# Patient Record
Sex: FEMALE | Race: WHITE | Employment: OTHER | URBAN - METROPOLITAN AREA
[De-identification: names, ages, dates, MRNs, and addresses within clinical notes are randomized per-mention and may not be internally consistent; named-entity substitution may affect disease eponyms.]

---

## 2017-03-14 ENCOUNTER — OFFICE VISIT (OUTPATIENT)
Dept: PRIMARY CARE CLINIC | Age: 57
End: 2017-03-14
Payer: MEDICARE

## 2017-03-14 VITALS
HEIGHT: 63 IN | BODY MASS INDEX: 24.45 KG/M2 | WEIGHT: 138 LBS | DIASTOLIC BLOOD PRESSURE: 68 MMHG | RESPIRATION RATE: 18 BRPM | SYSTOLIC BLOOD PRESSURE: 118 MMHG | HEART RATE: 64 BPM

## 2017-03-14 DIAGNOSIS — R51.9 FREQUENT HEADACHES: ICD-10-CM

## 2017-03-14 DIAGNOSIS — F99 INSOMNIA DUE TO OTHER MENTAL DISORDER: ICD-10-CM

## 2017-03-14 DIAGNOSIS — F41.9 ANXIETY: Primary | ICD-10-CM

## 2017-03-14 DIAGNOSIS — F51.05 INSOMNIA DUE TO OTHER MENTAL DISORDER: ICD-10-CM

## 2017-03-14 DIAGNOSIS — J32.4 CHRONIC PANSINUSITIS: ICD-10-CM

## 2017-03-14 PROCEDURE — 99214 OFFICE O/P EST MOD 30 MIN: CPT | Performed by: NURSE PRACTITIONER

## 2017-03-14 RX ORDER — ALPRAZOLAM 0.5 MG/1
1 TABLET ORAL NIGHTLY PRN
Qty: 60 TABLET | Refills: 1 | Status: SHIPPED | OUTPATIENT
Start: 2017-03-14 | End: 2017-05-18 | Stop reason: SDUPTHER

## 2017-03-14 RX ORDER — FEXOFENADINE HCL 180 MG/1
180 TABLET ORAL DAILY
COMMUNITY
Start: 2017-03-07 | End: 2017-10-27

## 2017-03-14 RX ORDER — FLUTICASONE PROPIONATE 50 MCG
2 SPRAY, SUSPENSION (ML) NASAL DAILY
COMMUNITY
Start: 2017-03-07

## 2017-03-14 ASSESSMENT — ENCOUNTER SYMPTOMS
ABDOMINAL PAIN: 0
TROUBLE SWALLOWING: 0
SORE THROAT: 0
SHORTNESS OF BREATH: 0
DIARRHEA: 0
SINUS COMPLAINT: 1
WHEEZING: 0
NAUSEA: 0
COUGH: 0
VOMITING: 0
SINUS PRESSURE: 0
CONSTIPATION: 0
BLOOD IN STOOL: 0

## 2017-05-18 DIAGNOSIS — F41.9 ANXIETY: ICD-10-CM

## 2017-05-19 RX ORDER — ALPRAZOLAM 0.5 MG/1
1 TABLET ORAL NIGHTLY PRN
Qty: 60 TABLET | Refills: 1 | Status: SHIPPED | OUTPATIENT
Start: 2017-05-19 | End: 2017-07-21 | Stop reason: SDUPTHER

## 2017-07-21 DIAGNOSIS — F41.9 ANXIETY: ICD-10-CM

## 2017-07-21 RX ORDER — ALPRAZOLAM 0.5 MG/1
TABLET ORAL
Qty: 60 TABLET | Refills: 0 | Status: SHIPPED | OUTPATIENT
Start: 2017-07-21 | End: 2017-09-01 | Stop reason: SDUPTHER

## 2017-09-01 DIAGNOSIS — F41.9 ANXIETY: ICD-10-CM

## 2017-09-01 RX ORDER — ALPRAZOLAM 0.5 MG/1
TABLET ORAL
Qty: 60 TABLET | Refills: 0 | Status: SHIPPED | OUTPATIENT
Start: 2017-09-01 | End: 2017-10-20 | Stop reason: SDUPTHER

## 2017-10-20 DIAGNOSIS — F41.9 ANXIETY: ICD-10-CM

## 2017-10-20 RX ORDER — ALPRAZOLAM 0.5 MG/1
TABLET ORAL
Qty: 60 TABLET | Refills: 0 | OUTPATIENT
Start: 2017-10-20

## 2017-10-20 NOTE — TELEPHONE ENCOUNTER
Last OV 03/14/2017      Health Maintenance   Topic Date Due    Hepatitis C screen  1960    HIV screen  04/20/1977    DTaP/Tdap/Td vaccine (1 - Tdap) 04/20/1981    Pneumococcal med risk (1 of 1 - PPSV23) 04/20/1981    Cervical cancer screen  04/20/1983    Breast cancer screen  04/20/2012    Colon cancer screen colonoscopy  04/20/2012    Flu vaccine (1) 09/01/2017    Lipid screen  09/16/2021             (applicable per patient's age: Cancer Screenings, Depression Screening, Fall Risk Screening, Immunizations)    BUN (mg/dL)   Date Value   08/01/2016 11      (goal A1C is < 7)   (goal LDL is <100) need 30-50% reduction from baseline     BP Readings from Last 3 Encounters:   03/14/17 118/68   09/23/16 142/76   08/08/16 110/60    (goal /80)      All Future Testing planned in [facility]:      Next Visit Date:  No future appointments.          Patient Active Problem List:     Sherle Boop Quervain's disease (tenosynovitis)     Ganglion cyst of wrist     Smoker     Chronic pansinusitis

## 2017-10-22 RX ORDER — ALPRAZOLAM 0.5 MG/1
TABLET ORAL
Qty: 60 TABLET | Refills: 0 | Status: SHIPPED | OUTPATIENT
Start: 2017-10-22 | End: 2017-11-21 | Stop reason: SDUPTHER

## 2017-10-27 ENCOUNTER — OFFICE VISIT (OUTPATIENT)
Dept: PRIMARY CARE CLINIC | Age: 57
End: 2017-10-27

## 2017-10-27 VITALS
HEIGHT: 63 IN | SYSTOLIC BLOOD PRESSURE: 122 MMHG | WEIGHT: 134.2 LBS | HEART RATE: 68 BPM | RESPIRATION RATE: 18 BRPM | BODY MASS INDEX: 23.78 KG/M2 | DIASTOLIC BLOOD PRESSURE: 74 MMHG

## 2017-10-27 DIAGNOSIS — F41.9 ANXIETY: ICD-10-CM

## 2017-10-27 PROCEDURE — 99213 OFFICE O/P EST LOW 20 MIN: CPT | Performed by: NURSE PRACTITIONER

## 2017-10-27 ASSESSMENT — ENCOUNTER SYMPTOMS
WHEEZING: 0
TROUBLE SWALLOWING: 0
DIARRHEA: 0
SHORTNESS OF BREATH: 0
SORE THROAT: 0
NAUSEA: 0
ABDOMINAL PAIN: 0
BLOOD IN STOOL: 0
CONSTIPATION: 0
COUGH: 0
SINUS PRESSURE: 0
VOMITING: 0

## 2017-10-27 ASSESSMENT — PATIENT HEALTH QUESTIONNAIRE - PHQ9
SUM OF ALL RESPONSES TO PHQ QUESTIONS 1-9: 0
SUM OF ALL RESPONSES TO PHQ9 QUESTIONS 1 & 2: 0
1. LITTLE INTEREST OR PLEASURE IN DOING THINGS: 0
2. FEELING DOWN, DEPRESSED OR HOPELESS: 0

## 2017-10-27 NOTE — PROGRESS NOTES
sprays by Nasal route daily       Multiple Vitamins-Minerals (CENTRUM SILVER ULTRA WOMENS) TABS Take  by mouth. No current facility-administered medications for this visit. Allergies   Allergen Reactions    Morphine Nausea Only     nausea    Seasonal Other (See Comments)     Sinus problems       Health Maintenance   Topic Date Due    Hepatitis C screen  1960    HIV screen  04/20/1977    DTaP/Tdap/Td vaccine (1 - Tdap) 04/20/1981    Pneumococcal med risk (1 of 1 - PPSV23) 04/20/1981    Cervical cancer screen  04/20/1983    Breast cancer screen  04/20/2012    Colon cancer screen colonoscopy  04/20/2012    Flu vaccine (1) 10/27/2018 (Originally 9/1/2017)    Lipid screen  09/16/2021       Subjective:      Review of Systems   Constitutional: Negative for activity change, appetite change, chills, fatigue, fever and unexpected weight change. HENT: Negative for congestion, ear pain, hearing loss, sinus pressure, sore throat and trouble swallowing. Eyes: Negative for visual disturbance. Respiratory: Negative for cough, shortness of breath and wheezing. Cardiovascular: Negative for chest pain, palpitations and leg swelling. Gastrointestinal: Negative for abdominal pain, blood in stool, constipation, diarrhea, nausea and vomiting. Endocrine: Negative for cold intolerance, heat intolerance, polydipsia, polyphagia and polyuria. Genitourinary: Negative for difficulty urinating, frequency, hematuria and urgency. Musculoskeletal: Negative for arthralgias and myalgias. Skin: Negative for rash. Allergic/Immunologic: Negative for environmental allergies. Neurological: Negative for dizziness, weakness, light-headedness and headaches. Psychiatric/Behavioral: Negative for confusion. The patient is not nervous/anxious. Objective:     Physical Exam   Constitutional: She is oriented to person, place, and time. She appears well-developed and well-nourished.    HENT:   Head: Normocephalic. Eyes: Conjunctivae and EOM are normal. Pupils are equal, round, and reactive to light. Neck: Normal range of motion. Cardiovascular: Normal rate, regular rhythm, normal heart sounds and intact distal pulses. No murmur heard. Pulmonary/Chest: Effort normal and breath sounds normal. She has no wheezes. Abdominal: Soft. Bowel sounds are normal. She exhibits no distension. Musculoskeletal: Normal range of motion. Neurological: She is alert and oriented to person, place, and time. Skin: Skin is warm and dry. Psychiatric: She has a normal mood and affect. Her behavior is normal. Judgment and thought content normal.     /74 (Site: Left Arm, Position: Sitting, Cuff Size: Medium Adult)   Pulse 68   Resp 18   Ht 5' 3\" (1.6 m)   Wt 134 lb 3.2 oz (60.9 kg)   LMP 03/01/2011   BMI 23.77 kg/m²     Assessment:      1. Anxiety               Plan:      Return in about 6 months (around 4/27/2018) for depression/anxiety. Anxiety-well-controlled at this time with when necessary use of Xanax. Again discussed use of daily SSRI or S NRI to manage anxiety but she declines, is worried about side effects. Patient given educational materials - see patient instructions. Discussed use, benefit, and side effects of prescribed medications. All patient questions answered. Pt voiced understanding. Reviewed health maintenance. Instructed to continue current medications, diet and exercise. Patient agreed with treatment plan. Follow up as directed.      Electronically signed by Kelton Nixon CNP on 10/27/2017 at 4:44 PM

## 2017-11-21 DIAGNOSIS — F41.9 ANXIETY: ICD-10-CM

## 2017-11-21 RX ORDER — ALPRAZOLAM 0.5 MG/1
TABLET ORAL
Qty: 60 TABLET | Refills: 0 | Status: SHIPPED | OUTPATIENT
Start: 2017-11-21 | End: 2017-12-22 | Stop reason: SDUPTHER

## 2017-11-21 NOTE — TELEPHONE ENCOUNTER
Last OV 10/27/2017      Health Maintenance   Topic Date Due    Hepatitis C screen  1960    HIV screen  04/20/1977    DTaP/Tdap/Td vaccine (1 - Tdap) 04/20/1981    Pneumococcal med risk (1 of 1 - PPSV23) 04/20/1981    Cervical cancer screen  04/20/1983    Breast cancer screen  04/20/2012    Colon cancer screen colonoscopy  04/20/2012    Flu vaccine (1) 10/27/2018 (Originally 9/1/2017)    Lipid screen  09/16/2021             (applicable per patient's age: Cancer Screenings, Depression Screening, Fall Risk Screening, Immunizations)    BUN (mg/dL)   Date Value   08/01/2016 11      (goal A1C is < 7)   (goal LDL is <100) need 30-50% reduction from baseline     BP Readings from Last 3 Encounters:   10/27/17 122/74   03/14/17 118/68   09/23/16 142/76    (goal /80)      All Future Testing planned in [facility]:      Next Visit Date:  No future appointments.          Patient Active Problem List:     Sharona Paulino Quervain's disease (tenosynovitis)     Ganglion cyst of wrist     Smoker     Chronic pansinusitis     Anxiety

## 2017-12-22 DIAGNOSIS — F41.9 ANXIETY: ICD-10-CM

## 2017-12-22 RX ORDER — ALPRAZOLAM 0.5 MG/1
TABLET ORAL
Qty: 60 TABLET | Refills: 0 | Status: SHIPPED | OUTPATIENT
Start: 2017-12-22 | End: 2018-01-23 | Stop reason: SDUPTHER

## 2017-12-22 NOTE — TELEPHONE ENCOUNTER
Last OV 10/27/2017      Health Maintenance   Topic Date Due    Hepatitis C screen  1960    HIV screen  04/20/1977    DTaP/Tdap/Td vaccine (1 - Tdap) 04/20/1981    Pneumococcal med risk (1 of 1 - PPSV23) 04/20/1981    Cervical cancer screen  04/20/1983    Breast cancer screen  04/20/2012    Colon cancer screen colonoscopy  04/20/2012    Flu vaccine (1) 10/27/2018 (Originally 9/1/2017)    Lipid screen  09/16/2021             (applicable per patient's age: Cancer Screenings, Depression Screening, Fall Risk Screening, Immunizations)    BUN (mg/dL)   Date Value   08/01/2016 11      (goal A1C is < 7)   (goal LDL is <100) need 30-50% reduction from baseline     BP Readings from Last 3 Encounters:   10/27/17 122/74   03/14/17 118/68   09/23/16 142/76    (goal /80)      All Future Testing planned in [facility]:      Next Visit Date:  No future appointments.          Patient Active Problem List:     Raghu Sin Quervain's disease (tenosynovitis)     Ganglion cyst of wrist     Smoker     Chronic pansinusitis     Anxiety

## 2018-01-23 DIAGNOSIS — F41.9 ANXIETY: ICD-10-CM

## 2018-01-23 RX ORDER — ALPRAZOLAM 0.5 MG/1
TABLET ORAL
Qty: 60 TABLET | Refills: 0 | Status: SHIPPED | OUTPATIENT
Start: 2018-01-23 | End: 2018-02-28 | Stop reason: SDUPTHER

## 2018-02-27 DIAGNOSIS — F41.9 ANXIETY: ICD-10-CM

## 2018-02-28 RX ORDER — ALPRAZOLAM 0.5 MG/1
TABLET ORAL
Qty: 60 TABLET | Refills: 0 | Status: SHIPPED | OUTPATIENT
Start: 2018-02-28 | End: 2018-03-28 | Stop reason: SDUPTHER

## 2018-02-28 NOTE — TELEPHONE ENCOUNTER
Last OV 10/27/17  D Blayne Diaz out of office today and pt needs refilled    Health Maintenance   Topic Date Due    Hepatitis C screen  1960    HIV screen  04/20/1977    DTaP/Tdap/Td vaccine (1 - Tdap) 04/20/1981    Pneumococcal med risk (1 of 1 - PPSV23) 04/20/1981    Cervical cancer screen  04/20/1983    Breast cancer screen  04/20/2012    Shingles Vaccine (1 of 2 - 2 Dose Series) 04/20/2012    Colon cancer screen colonoscopy  04/20/2012    Flu vaccine (1) 10/27/2018 (Originally 9/1/2017)    Lipid screen  09/16/2021             (applicable per patient's age: Cancer Screenings, Depression Screening, Fall Risk Screening, Immunizations)    BUN (mg/dL)   Date Value   08/01/2016 11      (goal A1C is < 7)   (goal LDL is <100) need 30-50% reduction from baseline     BP Readings from Last 3 Encounters:   10/27/17 122/74   03/14/17 118/68   09/23/16 142/76    (goal /80)      All Future Testing planned in [facility]:      Next Visit Date:  No future appointments.          Patient Active Problem List:     Shea Dawley Quervain's disease (tenosynovitis)     Ganglion cyst of wrist     Smoker     Chronic pansinusitis     Anxiety

## 2018-03-28 DIAGNOSIS — F41.9 ANXIETY: ICD-10-CM

## 2018-03-28 RX ORDER — ALPRAZOLAM 0.5 MG/1
TABLET ORAL
Qty: 60 TABLET | Refills: 0 | Status: SHIPPED | OUTPATIENT
Start: 2018-03-28 | End: 2018-04-24 | Stop reason: SDUPTHER

## 2018-04-24 DIAGNOSIS — F41.9 ANXIETY: ICD-10-CM

## 2018-04-24 RX ORDER — ALPRAZOLAM 0.5 MG/1
TABLET ORAL
Qty: 60 TABLET | Refills: 0 | Status: SHIPPED | OUTPATIENT
Start: 2018-04-24 | End: 2018-05-29 | Stop reason: SDUPTHER

## 2018-05-29 DIAGNOSIS — F41.9 ANXIETY: ICD-10-CM

## 2018-05-30 RX ORDER — ALPRAZOLAM 0.5 MG/1
TABLET ORAL
Qty: 60 TABLET | Refills: 0 | Status: SHIPPED | OUTPATIENT
Start: 2018-05-30 | End: 2018-06-22 | Stop reason: SDUPTHER

## 2018-06-22 ENCOUNTER — OFFICE VISIT (OUTPATIENT)
Dept: PRIMARY CARE CLINIC | Age: 58
End: 2018-06-22

## 2018-06-22 VITALS
DIASTOLIC BLOOD PRESSURE: 76 MMHG | HEART RATE: 68 BPM | BODY MASS INDEX: 21.97 KG/M2 | RESPIRATION RATE: 18 BRPM | WEIGHT: 124 LBS | SYSTOLIC BLOOD PRESSURE: 124 MMHG | HEIGHT: 63 IN

## 2018-06-22 DIAGNOSIS — F41.9 ANXIETY: ICD-10-CM

## 2018-06-22 PROCEDURE — 99212 OFFICE O/P EST SF 10 MIN: CPT | Performed by: NURSE PRACTITIONER

## 2018-06-22 RX ORDER — ALPRAZOLAM 0.5 MG/1
TABLET ORAL
Qty: 60 TABLET | Refills: 0 | Status: SHIPPED | OUTPATIENT
Start: 2018-06-22 | End: 2018-08-03 | Stop reason: SDUPTHER

## 2018-06-22 ASSESSMENT — ENCOUNTER SYMPTOMS
SORE THROAT: 0
DIARRHEA: 0
SINUS PRESSURE: 0
WHEEZING: 0
NAUSEA: 0
VOMITING: 0
COUGH: 0
SHORTNESS OF BREATH: 0
TROUBLE SWALLOWING: 0
ABDOMINAL PAIN: 0
CONSTIPATION: 0
BLOOD IN STOOL: 0

## 2018-08-03 DIAGNOSIS — F41.9 ANXIETY: ICD-10-CM

## 2018-08-03 RX ORDER — ALPRAZOLAM 0.5 MG/1
TABLET ORAL
Qty: 60 TABLET | Refills: 0 | Status: SHIPPED | OUTPATIENT
Start: 2018-08-03 | End: 2018-08-28 | Stop reason: SDUPTHER

## 2018-08-28 DIAGNOSIS — F41.9 ANXIETY: ICD-10-CM

## 2018-08-28 RX ORDER — ALPRAZOLAM 0.5 MG/1
TABLET ORAL
Qty: 60 TABLET | Refills: 0 | Status: SHIPPED | OUTPATIENT
Start: 2018-08-28 | End: 2018-10-02 | Stop reason: SDUPTHER

## 2018-08-28 NOTE — TELEPHONE ENCOUNTER
Patient said she has appointment on 11/16/18 with new PCP      Last OV 06/22/18    Health Maintenance   Topic Date Due    Hepatitis C screen  1960    HIV screen  04/20/1977    DTaP/Tdap/Td vaccine (1 - Tdap) 04/20/1981    Pneumococcal med risk (1 of 1 - PPSV23) 04/20/1981    Cervical cancer screen  04/20/1983    Breast cancer screen  04/20/2012    Shingles Vaccine (1 of 2 - 2 Dose Series) 04/20/2012    Colon cancer screen colonoscopy  04/20/2012    Flu vaccine (1) 10/27/2018 (Originally 9/1/2018)    Lipid screen  09/16/2021             (applicable per patient's age: Cancer Screenings, Depression Screening, Fall Risk Screening, Immunizations)    BUN (mg/dL)   Date Value   08/01/2016 11      (goal A1C is < 7)   (goal LDL is <100) need 30-50% reduction from baseline     BP Readings from Last 3 Encounters:   06/22/18 124/76   10/27/17 122/74   03/14/17 118/68    (goal /80)      All Future Testing planned in [facility]:      Next Visit Date:  No future appointments.          Patient Active Problem List:     Authur Serum Quervain's disease (tenosynovitis)     Ganglion cyst of wrist     Smoker     Chronic pansinusitis     Anxiety

## 2018-10-02 DIAGNOSIS — F41.9 ANXIETY: ICD-10-CM

## 2018-10-02 RX ORDER — ALPRAZOLAM 0.5 MG/1
TABLET ORAL
Qty: 60 TABLET | Refills: 0 | Status: SHIPPED | OUTPATIENT
Start: 2018-10-02 | End: 2018-11-01

## 2019-07-24 ENCOUNTER — TELEPHONE (OUTPATIENT)
Dept: PRIMARY CARE CLINIC | Age: 59
End: 2019-07-24